# Patient Record
Sex: FEMALE | Race: WHITE
[De-identification: names, ages, dates, MRNs, and addresses within clinical notes are randomized per-mention and may not be internally consistent; named-entity substitution may affect disease eponyms.]

---

## 2019-04-04 ENCOUNTER — HOSPITAL ENCOUNTER (OUTPATIENT)
Dept: HOSPITAL 7 - FB.SDS | Age: 64
Discharge: HOME | End: 2019-04-04
Attending: SURGERY
Payer: MEDICARE

## 2019-04-04 VITALS — SYSTOLIC BLOOD PRESSURE: 138 MMHG | DIASTOLIC BLOOD PRESSURE: 78 MMHG

## 2019-04-04 DIAGNOSIS — E03.9: ICD-10-CM

## 2019-04-04 DIAGNOSIS — H90.6: ICD-10-CM

## 2019-04-04 DIAGNOSIS — Z79.890: ICD-10-CM

## 2019-04-04 DIAGNOSIS — Z86.010: ICD-10-CM

## 2019-04-04 DIAGNOSIS — J45.909: ICD-10-CM

## 2019-04-04 DIAGNOSIS — Z88.8: ICD-10-CM

## 2019-04-04 DIAGNOSIS — Z12.11: Primary | ICD-10-CM

## 2019-04-04 DIAGNOSIS — E66.9: ICD-10-CM

## 2019-04-04 DIAGNOSIS — D12.0: ICD-10-CM

## 2019-04-04 DIAGNOSIS — Z88.0: ICD-10-CM

## 2019-04-04 NOTE — OR
DATE OF OPERATION:  04/04/2019

 

SURGEON:  Humberto Vazquez MD

 

PROCEDURE PERFORMED:  Colonoscopy with hot loop snare biopsy.

 

PREOPERATIVE DIAGNOSIS:  Personal history of colon polyps.

 

POSTOPERATIVE DIAGNOSIS:  Cecal polyp.

 

INDICATIONS FOR PROCEDURE:  This is a 63-year-old white female with a history of

adenomatous polyps.  She is due for a followup scope.  She was offered and

accepted same.

 

DESCRIPTION OF OPERATION:  After an excellent IV sedation was administered,

digital rectal exam was performed.  No marked abnormality was noted.  Flexible

colonoscope was inserted and advanced to the cecum.  The prep was excellent.

The following findings were noted.  In the cecum, pedunculated polyp.  This was

biopsied with a hot loop snare and submitted in a container.  The remainder of

the ascending colon was unremarkable.  Transverse colon was unremarkable.

Descending colon, occasional diverticula.  Sigmoid, occasional diverticula.

Rectum and anus, unremarkable.  Colon was deflated.  The scope was removed.  The

patient tolerated the procedure well, was taken to recovery in good condition.

Results by letter.

 

Job#: [id number]

DD: 04/04/2019 1017

DT: 04/04/2019 1112 AS/MODL

## 2019-04-04 NOTE — PCM.OPNOTE
- General Post-Op/Procedure Note


Date of Surgery/Procedure: 04/04/19


Operative Procedure(s): c scope with bx


Findings: 





cecal polyp 


sigmoid diverticulosis 





Pre Op Diagnosis: hx of colon polyps


Post-Op Diagnosis: cecal polyp.  sigmod diverticulosis


Anesthesia Technique: MAC


Primary Surgeon: Humberto Vazquez


Anesthesia Provider: Ac Connor


Pathology: 





cecal polyp 


Complications: None


Condition: Good


Free Text/Narrative:: 





see dictation